# Patient Record
Sex: MALE | Race: BLACK OR AFRICAN AMERICAN | NOT HISPANIC OR LATINO | Employment: UNEMPLOYED | URBAN - METROPOLITAN AREA
[De-identification: names, ages, dates, MRNs, and addresses within clinical notes are randomized per-mention and may not be internally consistent; named-entity substitution may affect disease eponyms.]

---

## 2023-11-02 ENCOUNTER — OFFICE VISIT (OUTPATIENT)
Dept: OTOLARYNGOLOGY | Facility: CLINIC | Age: 18
End: 2023-11-02
Payer: COMMERCIAL

## 2023-11-02 DIAGNOSIS — Z93.0 TRACHEOSTOMY DEPENDENT (MULTI): ICD-10-CM

## 2023-11-02 DIAGNOSIS — Z93.0 TRACHEOSTOMY STATUS (MULTI): ICD-10-CM

## 2023-11-02 DIAGNOSIS — Z43.0 TRACHEOSTOMY CARE (MULTI): Primary | ICD-10-CM

## 2023-11-02 PROBLEM — K59.00 CONSTIPATION: Status: ACTIVE | Noted: 2023-11-02

## 2023-11-02 PROBLEM — J38.01 VOCAL CORD PARALYSIS, UNILATERAL COMPLETE: Status: ACTIVE | Noted: 2023-11-02

## 2023-11-02 PROBLEM — J39.8 TRACHEAL STENOSIS: Status: ACTIVE | Noted: 2023-11-02

## 2023-11-02 PROBLEM — M41.9 SCOLIOSIS: Status: ACTIVE | Noted: 2023-11-02

## 2023-11-02 PROBLEM — J96.10 CHRONIC RESPIRATORY FAILURE (MULTI): Status: ACTIVE | Noted: 2023-11-02

## 2023-11-02 PROBLEM — J45.30 MILD PERSISTENT ASTHMA WITHOUT COMPLICATION (HHS-HCC): Status: ACTIVE | Noted: 2023-11-02

## 2023-11-02 PROBLEM — E55.9 VITAMIN D DEFICIENCY: Status: ACTIVE | Noted: 2023-11-02

## 2023-11-02 PROBLEM — F29 PSYCHOSIS (MULTI): Chronic | Status: ACTIVE | Noted: 2022-09-07

## 2023-11-02 PROBLEM — J95.09: Status: ACTIVE | Noted: 2023-11-02

## 2023-11-02 PROBLEM — J38.6 SUBGLOTTIC STENOSIS: Status: ACTIVE | Noted: 2023-11-02

## 2023-11-02 PROCEDURE — 99214 OFFICE O/P EST MOD 30 MIN: CPT | Performed by: STUDENT IN AN ORGANIZED HEALTH CARE EDUCATION/TRAINING PROGRAM

## 2023-11-02 RX ORDER — FLUTICASONE PROPIONATE 110 UG/1
2 AEROSOL, METERED RESPIRATORY (INHALATION) 2 TIMES DAILY
COMMUNITY
Start: 2017-06-07

## 2023-11-02 RX ORDER — ALBUTEROL SULFATE 90 UG/1
2 AEROSOL, METERED RESPIRATORY (INHALATION) EVERY 4 HOURS PRN
COMMUNITY
Start: 2015-05-04

## 2023-11-02 RX ORDER — MUPIROCIN 20 MG/G
OINTMENT TOPICAL 2 TIMES DAILY
COMMUNITY
Start: 2017-07-25

## 2023-11-02 RX ORDER — QUETIAPINE FUMARATE 100 MG/1
1 TABLET, FILM COATED ORAL NIGHTLY
COMMUNITY
Start: 2023-02-06

## 2023-11-02 RX ORDER — ALBUTEROL SULFATE 0.83 MG/ML
2.5 SOLUTION RESPIRATORY (INHALATION) SEE ADMIN INSTRUCTIONS
COMMUNITY
Start: 2017-03-31

## 2023-11-02 RX ORDER — QUETIAPINE FUMARATE 50 MG/1
50 TABLET, FILM COATED ORAL NIGHTLY
COMMUNITY
Start: 2022-12-19

## 2023-11-02 RX ORDER — POLYETHYLENE GLYCOL 3350 17 G/17G
17 POWDER, FOR SOLUTION ORAL DAILY
COMMUNITY
Start: 2014-01-22

## 2023-11-02 NOTE — PROGRESS NOTES
Pediatric Otolaryngology - Head and Neck Surgery Outpatient New Patient Note    Chief Concern:  Establishment of trach care    Referred by Osbaldo    History Of Present Illness  Bandar Robins is a 18 y.o. male, who was a former established patient by Dr. Thorne, last seen in December 2021. 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 At last visit, Dr. Thorne discussed plans for an airway evaluation prior to planning trach decannulation, as well as upsizing tracheostomy at the time given the patient has progressed to an adult age. Unfortunately patient was lost to follow-up, so surgery has been postponed. He recently presented to the ED for abdominal pain and was referred to follow-up with ENT for long term trach management. Accompanied by sister.     Mother did most of trach care in home. Unfortunately, mother passed away several months ago, so they have not been able to adequately care for trach. The trach has not been changed in ~1 year. No breathing issues.     Past Medical History  He has a past medical history of 24 weeks gestation of pregnancy (03/30/2015), Congenital laryngomalacia, Other conditions influencing health status (03/30/2015), Other specified postprocedural states (03/30/2015), Outcome of delivery, unspecified (03/30/2015), and Paralysis of vocal cords and larynx, unspecified (04/07/2017).    Surgical History  He has a past surgical history that includes Other surgical history (01/22/2014).     Social History  He has no history on file for tobacco use, alcohol use, and drug use.    Family History  Family History   Problem Relation Name Age of Onset    Hypertension Mother      Sleep apnea Mother      Other (hysterectomy) Mother      Asthma Sister      Allergies Sister          multiple    Cystic fibrosis Other MUltiple Family members     Narcolepsy Other MUltiple Family members     Other (chronic lung disease)  Other MUltiple Family members         Allergies  Patient has no known allergies.    Review of Systems  A 12-point review of systems was performed and noted be negative except for that which was mentioned in the history of present illness     Last Recorded Vitals  There were no vitals taken for this visit.     PHYSICAL EXAMINATION:  Constitutional: Patient is alert and in No acute distress.   Head: ATNC  Eyes: Conjunctiva non-infected, EOMI, PERRL  Ears: External ears are normal with no deformities such as preauricular pits or tags. There is no mastoid swelling bilaterally. R EAC with severely impacted cerumen, L EAC patent with visible normal TM, no retraction.  Nose: External nasal anatomy normal, nasal passages patent and septum is midline. Nasal mucosa is pink and moist. There is no rhinorrhea, masses or polyps noted.  Oral Cavity: Lips, teeth and gums are in good condition. Oral mucosa is normal. Oropharynx is clear with no erythema, exudate or cobblestoning.   Neck: supple with no lymphadenopathy. Thyroid is nonpalpable and midline. 5.5 Tracheo peds length in place with HME. Stoma is healthy without granulation tissue  Skin: Skin of the head and neck are normal without rashes  Pulmonary: Respirations unlabored, no WOB noted, able to phonate around tracheostomy tube  Cardiovascular: Warm and well perfused, palpable pulses  Extremities: Appear normal, BUTTS x 4  Psych: age appropriate mood/affect  Neuro: Facial strength normal and symmetric.    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 Following up to establish care with ENT for possible decannulation planning.    PLAN:  OR for Flexible awake scope, followed by DLB, possible trach change to adult shiley trach with Dr. Thomas.    I have seen and examined the patient, performed all procedures, and reviewed all records.  I agree with the above history, physical exam, procedure notes, assessment and plan.    I  have personally reviewed and interpreted past medical records and diagnostic tests, obtained patient history, performed medical evaluation, counseled and educated patient/family members, ordered necessary medications/tests/procedures, communicated with other health care professionals.    This note was created using speech recognition transcription software/or scribe transcription services.  Despite proofreading, several typographical errors may be present that might affect the meaning of the content.  Please call with any questions.    Miriam Thomas MD  Pediatric Otolaryngology - Head and Neck Surgery   [facility]  11/2/2023

## 2023-11-06 PROBLEM — Z93.0 TRACHEOSTOMY STATUS (MULTI): Status: ACTIVE | Noted: 2023-11-02

## 2024-01-25 ENCOUNTER — HOSPITAL ENCOUNTER (OUTPATIENT)
Facility: HOSPITAL | Age: 19
Setting detail: OUTPATIENT SURGERY
Discharge: HOME | End: 2024-01-25
Attending: STUDENT IN AN ORGANIZED HEALTH CARE EDUCATION/TRAINING PROGRAM | Admitting: STUDENT IN AN ORGANIZED HEALTH CARE EDUCATION/TRAINING PROGRAM
Payer: COMMERCIAL

## 2024-01-25 ENCOUNTER — ANESTHESIA EVENT (OUTPATIENT)
Dept: OPERATING ROOM | Facility: HOSPITAL | Age: 19
End: 2024-01-25
Payer: COMMERCIAL

## 2024-01-25 ENCOUNTER — ANESTHESIA (OUTPATIENT)
Dept: OPERATING ROOM | Facility: HOSPITAL | Age: 19
End: 2024-01-25
Payer: COMMERCIAL

## 2024-01-25 VITALS
BODY MASS INDEX: 18.52 KG/M2 | HEIGHT: 63 IN | SYSTOLIC BLOOD PRESSURE: 92 MMHG | RESPIRATION RATE: 18 BRPM | TEMPERATURE: 97.3 F | WEIGHT: 104.5 LBS | OXYGEN SATURATION: 100 % | HEART RATE: 73 BPM | DIASTOLIC BLOOD PRESSURE: 66 MMHG

## 2024-01-25 DIAGNOSIS — Z93.0 TRACHEOSTOMY STATUS (MULTI): Primary | ICD-10-CM

## 2024-01-25 PROBLEM — K44.9 HIATAL HERNIA: Status: ACTIVE | Noted: 2024-01-25

## 2024-01-25 PROCEDURE — 7100000001 HC RECOVERY ROOM TIME - INITIAL BASE CHARGE: Performed by: STUDENT IN AN ORGANIZED HEALTH CARE EDUCATION/TRAINING PROGRAM

## 2024-01-25 PROCEDURE — 3600000008 HC OR TIME - EACH INCREMENTAL 1 MINUTE - PROCEDURE LEVEL THREE: Performed by: STUDENT IN AN ORGANIZED HEALTH CARE EDUCATION/TRAINING PROGRAM

## 2024-01-25 PROCEDURE — 3700000001 HC GENERAL ANESTHESIA TIME - INITIAL BASE CHARGE: Performed by: STUDENT IN AN ORGANIZED HEALTH CARE EDUCATION/TRAINING PROGRAM

## 2024-01-25 PROCEDURE — 3700000002 HC GENERAL ANESTHESIA TIME - EACH INCREMENTAL 1 MINUTE: Performed by: STUDENT IN AN ORGANIZED HEALTH CARE EDUCATION/TRAINING PROGRAM

## 2024-01-25 PROCEDURE — 3600000003 HC OR TIME - INITIAL BASE CHARGE - PROCEDURE LEVEL THREE: Performed by: STUDENT IN AN ORGANIZED HEALTH CARE EDUCATION/TRAINING PROGRAM

## 2024-01-25 PROCEDURE — 2500000004 HC RX 250 GENERAL PHARMACY W/ HCPCS (ALT 636 FOR OP/ED): Mod: SE

## 2024-01-25 PROCEDURE — 31525 DX LARYNGOSCOPY EXCL NB: CPT | Performed by: STUDENT IN AN ORGANIZED HEALTH CARE EDUCATION/TRAINING PROGRAM

## 2024-01-25 PROCEDURE — 7100000010 HC PHASE TWO TIME - EACH INCREMENTAL 1 MINUTE: Performed by: STUDENT IN AN ORGANIZED HEALTH CARE EDUCATION/TRAINING PROGRAM

## 2024-01-25 PROCEDURE — A31622 PR BRONCHOSCOPY,DIAGNOSTIC: Performed by: ANESTHESIOLOGY

## 2024-01-25 PROCEDURE — 2500000001 HC RX 250 WO HCPCS SELF ADMINISTERED DRUGS (ALT 637 FOR MEDICARE OP): Mod: SE | Performed by: STUDENT IN AN ORGANIZED HEALTH CARE EDUCATION/TRAINING PROGRAM

## 2024-01-25 PROCEDURE — 7100000002 HC RECOVERY ROOM TIME - EACH INCREMENTAL 1 MINUTE: Performed by: STUDENT IN AN ORGANIZED HEALTH CARE EDUCATION/TRAINING PROGRAM

## 2024-01-25 PROCEDURE — 7100000009 HC PHASE TWO TIME - INITIAL BASE CHARGE: Performed by: STUDENT IN AN ORGANIZED HEALTH CARE EDUCATION/TRAINING PROGRAM

## 2024-01-25 RX ORDER — DEXAMETHASONE SODIUM PHOSPHATE 100 MG/10ML
INJECTION INTRAMUSCULAR; INTRAVENOUS AS NEEDED
Status: DISCONTINUED | OUTPATIENT
Start: 2024-01-25 | End: 2024-01-25

## 2024-01-25 RX ORDER — SODIUM CHLORIDE, SODIUM LACTATE, POTASSIUM CHLORIDE, CALCIUM CHLORIDE 600; 310; 30; 20 MG/100ML; MG/100ML; MG/100ML; MG/100ML
100 INJECTION, SOLUTION INTRAVENOUS CONTINUOUS
Status: DISCONTINUED | OUTPATIENT
Start: 2024-01-25 | End: 2024-01-25 | Stop reason: HOSPADM

## 2024-01-25 RX ORDER — ONDANSETRON HYDROCHLORIDE 2 MG/ML
INJECTION, SOLUTION INTRAVENOUS AS NEEDED
Status: DISCONTINUED | OUTPATIENT
Start: 2024-01-25 | End: 2024-01-25

## 2024-01-25 RX ORDER — LIDOCAINE HYDROCHLORIDE 40 MG/ML
SOLUTION TOPICAL AS NEEDED
Status: DISCONTINUED | OUTPATIENT
Start: 2024-01-25 | End: 2024-01-25 | Stop reason: HOSPADM

## 2024-01-25 RX ORDER — FENTANYL CITRATE 50 UG/ML
INJECTION, SOLUTION INTRAMUSCULAR; INTRAVENOUS AS NEEDED
Status: DISCONTINUED | OUTPATIENT
Start: 2024-01-25 | End: 2024-01-25

## 2024-01-25 RX ORDER — PROPOFOL 10 MG/ML
INJECTION, EMULSION INTRAVENOUS AS NEEDED
Status: DISCONTINUED | OUTPATIENT
Start: 2024-01-25 | End: 2024-01-25

## 2024-01-25 RX ORDER — MIDAZOLAM HYDROCHLORIDE 1 MG/ML
INJECTION INTRAMUSCULAR; INTRAVENOUS AS NEEDED
Status: DISCONTINUED | OUTPATIENT
Start: 2024-01-25 | End: 2024-01-25

## 2024-01-25 RX ADMIN — PROPOFOL 30 MG: 10 INJECTION, EMULSION INTRAVENOUS at 13:47

## 2024-01-25 RX ADMIN — PROPOFOL 20 MG: 10 INJECTION, EMULSION INTRAVENOUS at 13:48

## 2024-01-25 RX ADMIN — FENTANYL CITRATE 25 MCG: 50 INJECTION, SOLUTION INTRAMUSCULAR; INTRAVENOUS at 13:41

## 2024-01-25 RX ADMIN — DEXAMETHASONE SODIUM PHOSPHATE 4 MG: 10 INJECTION INTRAMUSCULAR; INTRAVENOUS at 13:49

## 2024-01-25 RX ADMIN — PROPOFOL 10 MG: 10 INJECTION, EMULSION INTRAVENOUS at 13:45

## 2024-01-25 RX ADMIN — ONDANSETRON 4 MG: 2 INJECTION INTRAMUSCULAR; INTRAVENOUS at 13:49

## 2024-01-25 RX ADMIN — MIDAZOLAM HYDROCHLORIDE 2 MG: 1 INJECTION, SOLUTION INTRAMUSCULAR; INTRAVENOUS at 13:41

## 2024-01-25 SDOH — HEALTH STABILITY: MENTAL HEALTH: CURRENT SMOKER: 0

## 2024-01-25 ASSESSMENT — PAIN SCALES - GENERAL
PAINLEVEL_OUTOF10: 0 - NO PAIN
PAINLEVEL_OUTOF10: 0 - NO PAIN
PAIN_LEVEL: 0
PAINLEVEL_OUTOF10: 0 - NO PAIN

## 2024-01-25 ASSESSMENT — COLUMBIA-SUICIDE SEVERITY RATING SCALE - C-SSRS
2. HAVE YOU ACTUALLY HAD ANY THOUGHTS OF KILLING YOURSELF?: NO
1. IN THE PAST MONTH, HAVE YOU WISHED YOU WERE DEAD OR WISHED YOU COULD GO TO SLEEP AND NOT WAKE UP?: NO
6. HAVE YOU EVER DONE ANYTHING, STARTED TO DO ANYTHING, OR PREPARED TO DO ANYTHING TO END YOUR LIFE?: NO

## 2024-01-25 ASSESSMENT — PAIN - FUNCTIONAL ASSESSMENT
PAIN_FUNCTIONAL_ASSESSMENT: FLACC (FACE, LEGS, ACTIVITY, CRY, CONSOLABILITY)
PAIN_FUNCTIONAL_ASSESSMENT: 0-10

## 2024-01-25 NOTE — ANESTHESIA POSTPROCEDURE EVALUATION
Patient: Bandar Robins    Procedure Summary       Date: 01/25/24 Room / Location: [facility] OR 01 / [facility]    Anesthesia Start: 1333 Anesthesia Stop: 1400    Procedures:       Direct Laryngoscopy      Bronchoscopy Diagnosis:       Tracheostomy status (CMS/[facility])      (Tracheostomy status (CMS/[facility]) [Z93.0])    Surgeons: Miriam Thomas MD Responsible Provider: Nishi Gunter MD    Anesthesia Type: general ASA Status: 3            Anesthesia Type: No value filed.    Vitals Value Taken Time   /72 01/25/24 1404   Temp 36.1 01/25/24 1404   Pulse 73 01/25/24 1404   Resp 20 01/25/24 1404   SpO2 100 01/25/24 1404       Anesthesia Post Evaluation    Patient location during evaluation: PACU  Patient participation: complete - patient participated  Level of consciousness: awake  Pain score: 0  Pain management: adequate  Airway patency: patent  Cardiovascular status: acceptable and blood pressure returned to baseline  Respiratory status: acceptable and room air (through trach)  Hydration status: acceptable  Postoperative Nausea and Vomiting: none        No notable events documented.

## 2024-01-25 NOTE — ANESTHESIA PREPROCEDURE EVALUATION
Patient: Bandar Robins    Procedure Information       Anesthesia Start Date/Time: 01/25/24 1333    Procedures:       Direct Laryngoscopy      Bronchoscopy - possible dilation possible steroid injection    Location: [facility] OR 01 / [facility]    Surgeons: Miriam Thomas MD            Relevant Problems   Anesthesia (within normal limits)      Cardiovascular (within normal limits)      Endocrine (within normal limits)      GI   (+) Hiatal hernia      /Renal (within normal limits)      Neuro/Psych  CP      Pulmonary  Subglottic stenosis, trach. 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    (+) Mild persistent asthma without complication      GI/Hepatic (within normal limits)      Hematology (within normal limits)      Musculoskeletal   (+) Scoliosis      Eyes, Ears, Nose, and Throat  ROP      Infectious Disease (within normal limits)      Other  24 week prematurity       Clinical information reviewed:   Tobacco  Allergies  Meds   Med Hx  Surg Hx   Fam Hx  Soc Hx        NPO Detail:  NPO/Void Status  Date of Last Liquid: 01/24/24  Time of Last Liquid: 2100  Date of Last Solid: 01/24/24  Time of Last Solid: 2100  Last Intake Type: Clear fluids; Food         Physical Exam    Airway  Mallampati: unable to assess  Neck ROM: full     Cardiovascular   Rhythm: regular  Rate: normal     Dental - normal exam     Pulmonary   Breath sounds clear to auscultation     Abdominal - normal exam       Other findings: Uncuffed trach          Anesthesia Plan    History of general anesthesia?: yes  History of complications of general anesthesia?: no    ASA 3     general     The patient is not a current smoker.    Anesthetic plan and risks discussed with patient.    Plan discussed with resident.

## 2024-01-25 NOTE — OP NOTE
OPERATIVE NOTE     Date:  2024 OR Location: [facility] OR    Name: Bandar Robins : 2005, Age: 18 y.o., MRN: [id number], Sex: male      Surgeons   Miriam Thomas MD    Resident/Fellow/Other Assistant:  None were associated with this case.    Anesthesia: General  ASA: III  Anesthesia Staff: Anesthesiologist: Nishi Gunter MD  Anesthesia Resident: Dave Huizar DO  Staff: Circulator: Sharona Serrano RN; Xi Thomason RN  Scrub Person: Yu Brito      Preoperative Diagnosis:  Tracheostomy dependence    Postoperative Diagnosis:  Tracheostomy dependence    Procedure:  Direct laryngoscopy  Bronchoscopy    Findings:  -Supraglottis: Questionable asymmetry of arytenoid height, with right complex seated slightly higher  -Glottis: Mobility unable to be fully assessed given degree of sedation, cords fixed in a paramedian position  -Subglottis: Inversion of tracheal cartilage just above stoma with 50% obstruction of airway lumen  -Trachea: Unremarkable    Estimated Blood Loss:  None    Implantables/Drains:  N/A    Complications:  None    Description of Procedure:  This patient is a 18 y.o.-year-old male who presents with tracheostomy dependence. Given this, decision was made to proceed to the operating room for the above listed procedures. Consent was obtained from the patient after discussing the risks, benefits, and alternatives of the procedure.    The patient was brought to the operating room and transferred to the table. A preoperative huddle was performed, confirming the correct patient, procedure, laterality, and allergies. The patient was induced under anesthesia and turned 90 degrees to the ENT team.     Using a laryngoscope, the larynx was visualized and topical lidocaine was sprayed onto the vocal cords. The zero-degree endoscope was inserted and advanced to the level of the larynx, through the cords, and to the distal trachea. The findings were noted above. Photodocumentation was  obtained. The endoscope and laryngoscope were withdrawn and mask ventilation resumed. At this point, the tracheostomy was removed and the stoma was noted to be clear of any granulation tissue. This completed our procedure.      The patient was then handed back over to the care of the anesthesia team, awakened, and taken to recovery in stable condition.     Dr. Miriam Thomas was present for the critical portions of the procedure.     Miriam Thomas MD  Pediatric Otolaryngology - Head and Neck Surgery   [facility]

## 2024-01-26 NOTE — H&P
History Of Present Illness  Bandar Robins is a 18 y.o. male presenting with tracheostomy dependence.    Previous history:  Bandar Robins is a 18 y.o. male, who was a former established patient by Dr. Thorne, last seen in December 2021. 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 At last visit, Dr. Thorne discussed plans for an airway evaluation prior to planning trach decannulation, as well as upsizing tracheostomy at the time given the patient has progressed to an adult age. Unfortunately patient was lost to follow-up, so surgery has been postponed. He recently presented to the ED for abdominal pain and was referred to follow-up with ENT for long term trach management. Accompanied by sister.      Mother did most of trach care in home. Unfortunately, mother passed away several months ago, so they have not been able to adequately care for trach. The trach has not been changed in ~1 year. No breathing issues.      Past Medical History  He has a past medical history of 24 weeks gestation of pregnancy (03/30/2015), Congenital laryngomalacia, Other conditions influencing health status (03/30/2015), Other specified postprocedural states (03/30/2015), Outcome of delivery, unspecified (03/30/2015), and Paralysis of vocal cords and larynx, unspecified (04/07/2017).    Surgical History  He has a past surgical history that includes Other surgical history (01/22/2014) and Gastrostomy tube placement.     Social History  He reports that he has never smoked. He has never used smokeless tobacco. He reports that he does not drink alcohol and does not use drugs.    Family History  Family History   Problem Relation Name Age of Onset    Hypertension Mother      Sleep apnea Mother      Other (hysterectomy) Mother      Asthma Sister      Allergies Sister          multiple    Cystic fibrosis Other MUltiple Family members     Narcolepsy Other MUltiple  "Family members     Other (chronic lung disease) Other MUltiple Family members         Allergies  Patient has no known allergies.    Review of Systems    Review of Systems   All other systems reviewed and are negative.     The following portions of the patient's history were reviewed and updated as appropriate: allergies, current medications, past family history, past medical history, past social history, past surgical history and problem list.    Physical Exam  Constitutional: Patient is alert and in No acute distress.   Head: ATNC  Eyes: Conjunctiva non-infected, EOMI, PERRL  Ears: External ears are normal with no deformities such as preauricular pits or tags. There is no mastoid swelling bilaterally. R EAC with severely impacted cerumen, L EAC patent with visible normal TM, no retraction.  Nose: External nasal anatomy normal, nasal passages patent and septum is midline. Nasal mucosa is pink and moist. There is no rhinorrhea, masses or polyps noted.  Oral Cavity: Lips, teeth and gums are in good condition. Oral mucosa is normal. Oropharynx is clear with no erythema, exudate or cobblestoning.   Neck: supple with no lymphadenopathy. Thyroid is nonpalpable and midline. 5.5 Tracheo peds length in place with HME. Stoma is healthy without granulation tissue  Skin: Skin of the head and neck are normal without rashes  Pulmonary: Respirations unlabored, no WOB noted, able to phonate around tracheostomy tube  Cardiovascular: Warm and well perfused, palpable pulses  Extremities: Appear normal, BUTTS x 4  Psych: age appropriate mood/affect  Neuro: Facial strength normal and symmetric.       Last Recorded Vitals  Blood pressure 92/66, pulse 73, temperature 36.3 °C (97.3 °F), temperature source Temporal, resp. rate 18, height 1.595 m (5' 2.8\"), weight 47.4 kg (104 lb 8 oz), SpO2 100 %.    Tracheostomy dependence     Assessment/Plan   Principal Problem:    Tracheostomy status (CMS/[facility])  Active Problems:    Hiatal " hernia      Surveillence airway examination. OR to DLB.       Miriam Thomas MD

## 2024-04-30 ENCOUNTER — APPOINTMENT (OUTPATIENT)
Dept: OPHTHALMOLOGY | Facility: CLINIC | Age: 19
End: 2024-04-30
Payer: COMMERCIAL

## 2024-05-09 ENCOUNTER — APPOINTMENT (OUTPATIENT)
Dept: OPHTHALMOLOGY | Facility: CLINIC | Age: 19
End: 2024-05-09
Payer: COMMERCIAL

## 2024-06-11 ENCOUNTER — APPOINTMENT (OUTPATIENT)
Dept: OPHTHALMOLOGY | Facility: CLINIC | Age: 19
End: 2024-06-11
Payer: COMMERCIAL

## 2024-08-22 ENCOUNTER — APPOINTMENT (OUTPATIENT)
Dept: OPHTHALMOLOGY | Facility: CLINIC | Age: 19
End: 2024-08-22
Payer: COMMERCIAL

## 2024-12-06 ENCOUNTER — APPOINTMENT (OUTPATIENT)
Dept: OPHTHALMOLOGY | Facility: CLINIC | Age: 19
End: 2024-12-06
Payer: COMMERCIAL

## 2024-12-31 ENCOUNTER — APPOINTMENT (OUTPATIENT)
Dept: OPHTHALMOLOGY | Facility: CLINIC | Age: 19
End: 2024-12-31
Payer: COMMERCIAL

## 2025-05-12 ENCOUNTER — APPOINTMENT (OUTPATIENT)
Dept: OTOLARYNGOLOGY | Facility: HOSPITAL | Age: 20
End: 2025-05-12
Payer: COMMERCIAL

## 2025-05-12 NOTE — PROGRESS NOTES
ASSESSMENT AND PLAN:   Bandar Robins is a 19 y.o. male presenting for an initial visit with findings of ***           Reason For Consult  No chief complaint on file.    Referred by Dr. Miriam Thomas; from his note 1/25/25:  tracheostomy dependence.     Previous history:  Bandar Robins is a 18 y.o. male, who was a former established patient by Dr. Thorne, last seen in December 2021. 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 At last visit, Dr. Thorne discussed plans for an airway evaluation prior to planning trach decannulation, as well as upsizing tracheostomy at the time given the patient has progressed to an adult age. Unfortunately patient was lost to follow-up, so surgery has been postponed. He recently presented to the ED for abdominal pain and was referred to follow-up with ENT for long term trach management. Accompanied by sister.      Mother did most of trach care in home. Unfortunately, mother passed away several months ago, so they have not been able to adequately care for trach. The trach has not been changed in ~1 year. No breathing issues.      HISTORY OF PRESENT ILLNESS:  Bandar Robins is a 19 y.o. male presenting for an initial visit with me for ***.      The patient reports ***.         Past Medical History  He has a past medical history of 24 weeks gestation of pregnancy ([facility]) (03/30/2015), Congenital laryngomalacia, Other conditions influencing health status (03/30/2015), Other specified postprocedural states (03/30/2015), Outcome of delivery, unspecified ([facility]) (03/30/2015), and Paralysis of vocal cords and larynx, unspecified (04/07/2017). Surgical History  He has a past surgical history that includes Other surgical history (01/22/2014) and Gastrostomy tube placement.   Social History  He reports that he has never smoked. He has never used smokeless tobacco. He reports that he does not drink alcohol  and does not use drugs. Allergies  Patient has no known allergies.     Family History  Family History[1]     Review of Systems  All 10 systems were reviewed and negative except for above.      Physical Exam    ENT Physical Exam   ENT Physical Exam  Constitutional  Appearance: patient appears well-developed and well-nourished,  Head and Face  Appearance: head appears normal and face appears normal;  Ear  Auricles: right auricle normal; left auricle normal;  Nose  External Nose: nares patent bilaterally;  Oral Cavity/Oropharynx  Lips: normal;  Neck  Neck: neck normal; neck palpation normal;  Respiratory  Inspection: no retractions visible;  Cardiovascular  Inspection: no peripheral edema present;  Neurovestibular  Mental Status: alert and oriented;  Psychiatric: mood normal;  Cranial Nerves: cranial nerves intact;    Last Recorded Vitals  There were no vitals taken for this visit.    Relevant Results       ----------------------------------------------------------------------  Procedures   ***    Time Spent  Prep time on day of patient encounter: 5-10 minutes  Time spent directly with patient, family or caregiver: 25 minutes  Additional Time Spent on Patient Care Activities/discuss care plan with SLP: 5 minutes  Documentation Time: 10 minutes  Other Time Spent: 0 minutes  Total: 50 minutes     Scribe Attestation  By signing my name below, I, Leeann Richards , [place]   attest that this documentation has been prepared under the direction and in the presence of Prince Gann MD.       [1]   Family History  Problem Relation Name Age of Onset    Hypertension Mother      Sleep apnea Mother      Other (hysterectomy) Mother      Asthma Sister      Allergies Sister          multiple    Cystic fibrosis Other MUltiple Family members     Narcolepsy Other MUltiple Family members     Other (chronic lung disease) Other MUltiple Family members

## 2025-06-09 ENCOUNTER — APPOINTMENT (OUTPATIENT)
Dept: OTOLARYNGOLOGY | Facility: HOSPITAL | Age: 20
End: 2025-06-09
Payer: COMMERCIAL

## 2025-06-09 VITALS — TEMPERATURE: 99 F | HEIGHT: 62 IN | BODY MASS INDEX: 19.14 KG/M2 | WEIGHT: 104 LBS

## 2025-06-09 DIAGNOSIS — Z43.0 TRACHEOSTOMY CARE: Primary | ICD-10-CM

## 2025-06-09 DIAGNOSIS — Z93.0 TRACHEOSTOMY DEPENDENT (MULTI): ICD-10-CM

## 2025-06-09 PROCEDURE — 99214 OFFICE O/P EST MOD 30 MIN: CPT | Mod: 25 | Performed by: OTOLARYNGOLOGY

## 2025-06-09 PROCEDURE — 31575 DIAGNOSTIC LARYNGOSCOPY: CPT | Performed by: OTOLARYNGOLOGY

## 2025-06-09 PROCEDURE — 31615 TRCHEOBRNCHSC EST TRACHS INC: CPT | Performed by: OTOLARYNGOLOGY

## 2025-06-09 ASSESSMENT — PATIENT HEALTH QUESTIONNAIRE - PHQ9
2. FEELING DOWN, DEPRESSED OR HOPELESS: NOT AT ALL
1. LITTLE INTEREST OR PLEASURE IN DOING THINGS: NOT AT ALL
SUM OF ALL RESPONSES TO PHQ9 QUESTIONS 1 & 2: 0

## 2025-06-09 ASSESSMENT — PAIN SCALES - GENERAL: PAINLEVEL_OUTOF10: 0-NO PAIN

## 2025-06-09 NOTE — PROGRESS NOTES
ASSESSMENT AND PLAN:   Bandar Robins is a 20 y.o. male presenting for an initial visit with findings of airway stenosis with tracheostomy. He has stenosis that is causing him to have difficulty with capping. I anticipate a partial left arytenoidectomy and removal of scar from the interarytenoid space will improve QOL and breathing quality leading to decannulation. The risks, indications, and complications were discussed with the patient.     He will follow up for this procedure. He will require 1-2 visits after this with possible decannulation before the year.          Assessment & Plan  The patient presents with airway stenosis with tracheostomy and a history of left vocal cord immobility who was found to have the following findings on bronchoscopy and laryngoscopy: airway narrowing causing capping difficulty, absent left arytenoid tissue with scarring, lateral supraglottic tension, fully closed glottis, and right movement potential on bronchoscopy. Physical exam showed no subglottic edema, thick mucus, granuloma, or diffuse laryngitis. Voice grade 2, roughness 1, breathiness 1, asthenia 1, strain 1. Reduced intelligibility, balanced resonance, reduced vocal loudness, and decreased breath support.    We discussed options for management to include: left partial arytenoidectomy and interarytenoid scar removal to improve breathing and enable decannulation.    Treatment plan:  - Left partial arytenoidectomy and interarytenoid scar removal.    Clinical decision making:  - Risks: injury to lips, teeth, gums, tongue, slight increased swallowing problems.  - Benefits: improved breathing, potential decannulation.  - Patient is a good candidate.    Follow-up:  - Follow-up for decannulation, aiming for end of year.    PROCEDURE  Procedure Performed  Bronchoscopy and laryngoscopy    Anesthesia  4% spray through stoma    Technique  Pre-Procedure:  - Risks: injury to lips, teeth, gums, tongue; laser use; steroid injection;  slight increased swallowing problems.  - Consent: Verbal consent obtained.    Intra-Procedure:  - Site Preparation: Removal of HME, 4% spray through stoma.    Post-Procedure:  - Tolerance: Tolerated well    Notes: Right vocal cord movement, left immobility, interarytenoid scar band, posterior web. Future procedure for scar band removal and partial arytenoidectomy recommended.        Reason For Consult  Chief Complaint   Patient presents with    Follow-up     Trach check,     Referred by Dr. Miriam Thomas; from his note 1/25/25:  tracheostomy dependence.     Previous history:  Bandar Robins is a 18 y.o. male, who was a former established patient by Dr. Thorne, last seen in December 2021. 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 At last visit, Dr. Thorne discussed plans for an airway evaluation prior to planning trach decannulation, as well as upsizing tracheostomy at the time given the patient has progressed to an adult age. Unfortunately patient was lost to follow-up, so surgery has been postponed. He recently presented to the ED for abdominal pain and was referred to follow-up with ENT for long term trach management. Accompanied by sister.      Mother did most of trach care in home. Unfortunately, mother passed away several months ago, so they have not been able to adequately care for trach. The trach has not been changed in ~1 year. No breathing issues.      HISTORY OF PRESENT ILLNESS:  Bandar Robins is a 20 y.o. male presenting for an initial visit with me for a trach change and evaluation.  He is breathing well and is able to perform his own tracheostomy care independently.      History of Present Illness  The patient is a 20-year-old male who has a tracheostomy due to BPD.    Tracheostomy Care  - He takes care of his trach on his own and last changed it about two months ago.  - He says his breathing feels fine, his voice  is strong, and he has no trouble swallowing.    Diet and Work Status  - He is able to eat a full diet and is not currently working.     Past Medical History  He has a past medical history of 24 weeks gestation of pregnancy (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 Surgical History  He has a past surgical history that includes Other surgical history (01/22/2014) and Gastrostomy tube placement.   Social History  He reports that he has never smoked. He has never used smokeless tobacco. He reports that he does not drink alcohol and does not use drugs. Allergies  Patient has no known allergies.     Family History  Family History[1]     Review of Systems  All 10 systems were reviewed and negative except for above.      Physical Exam    ENT Physical Exam   ENT Physical Exam  Constitutional  Appearance: patient appears well-developed and well-nourished,  Head and Face  Appearance: head appears normal and face appears normal;  Ear  Auricles: right auricle normal; left auricle normal;  Nose  External Nose: nares patent bilaterally;  Oral Cavity/Oropharynx  Lips: normal;  Neck  Neck: neck normal; neck palpation normal;  Respiratory  Inspection: no retractions visible;  Cardiovascular  Inspection: no peripheral edema present;  Neurovestibular  Mental Status: alert and oriented;  Psychiatric: mood normal;  Cranial Nerves: cranial nerves intact;    Physical Exam  Oral Cavity: Palate movement intact.  Specialty Assessment: Bronchoscopy and laryngoscopy show airway narrowing, posterior web, interarytenoid scar band, right vocal cord movement, left vocal cord immobility. No subglottic edema, thick mucus, granuloma, or diffuse laryngitis. Mild internal thickening, partial ventricular obliteration, no vocal  "fold edema. Left arytenoid tissue absent with scarring.    Results  - Imaging:    - Bronchoscopy: Airway narrowing, right vocal cord movement, left cord immobile, scarring, posterior scar band    - Laryngoscopy: Airway narrowing, right vocal cord movement, left cord immobile, scarring, posterior scar band      Last Recorded Vitals  Temperature 37.2 °C (99 °F), temperature source Temporal, height 1.575 m (5' 2\"), weight 47.2 kg (104 lb).    Relevant Results       ----------------------------------------------------------------------  Procedures   Flexible Laryngoscopy w/ Videostroboscopy    VOICE AND SPEECH CHARACTERISTICS:  Normal spoken speech, (+) moderate dysphonia, (+) mild roughness, (+) mild breathiness, (+) mild asthenia, (+) mild strain.    Intelligibility: reduced.   Resonance: balanced.   Vocal Loudness: reduced.   Breath Support: decreased.    PROCEDURE:    Indications: difficulty breathing  PROCEDURE NOTE: BRONCHOSCOPY VIA TRACHEOSTOMY   I recommended bronchoscopy via tracheostomy based on PE findings, and/or concern for pathology based upon history of airway complaints. Risks, benefits, and alternatives were explained. The patient wishes to proceed and gives verbal consent.  Patient is seated in the exam chair. After adequate topical anesthesia, I advance the flexible endoscope. The examination included evaluation of the rogers, vallecula, base of tongue, pyriforms, post-cricoid area, larynx and immediate subglottis.  Findings : assessment of the nasopharynx, base of tongue/vallecula, pyriform sinuses, post-cricoid area and pharyngeal walls was without lesion or mass, pharyngeal wall contraction is normal and symmetric, and no pooling of secretions  ventricular obliteration: 2 (present) and IA thickenin (mild)  Gross Arytenoid Movement: immobility left with absent left arytenoid tissue and scarring  Arytenoid Height: normal.   Supraglottic Tension: lateral.  Closure: closed.  Additional Findings: " movement potential on the right on bronchoscopy.      Time Spent  Prep time on day of patient encounter: 5-10 minutes  Time spent directly with patient, family or caregiver: 25 minutes  Additional Time Spent on Patient Care Activities/discuss care plan with SLP: 5 minutes  Documentation Time: 10 minutes  Other Time Spent: 0 minutes  Total: 50 minutes     Scribe Attestation  By signing my name below, I, Tracy Frausto , Scribe attest that this documentation has been prepared under the direction and in the presence of Prince Gann MD.      This medical note was created with the assistance of artificial intelligence (AI) for documentation purposes. The content has been reviewed and confirmed by the healthcare provider for accuracy and completeness. Patient consented to the use of audio recording and use of AI during their visit.          [1]   Family History  Problem Relation Name Age of Onset    Hypertension Mother      Sleep apnea Mother      Other (hysterectomy) Mother      Asthma Sister      Allergies Sister          multiple    Cystic fibrosis Other MUltiple Family members     Narcolepsy Other MUltiple Family members     Other (chronic lung disease) Other MUltiple Family members

## 2025-06-20 DIAGNOSIS — Z93.0 TRACHEOSTOMY DEPENDENT (MULTI): ICD-10-CM

## 2025-06-20 DIAGNOSIS — J39.8 TRACHEAL STENOSIS: ICD-10-CM

## 2025-07-23 ENCOUNTER — ANESTHESIA (OUTPATIENT)
Dept: OPERATING ROOM | Facility: HOSPITAL | Age: 20
End: 2025-07-23
Payer: COMMERCIAL

## 2025-07-23 ENCOUNTER — HOSPITAL ENCOUNTER (OUTPATIENT)
Facility: HOSPITAL | Age: 20
Setting detail: OUTPATIENT SURGERY
Discharge: HOME | End: 2025-07-23
Attending: OTOLARYNGOLOGY | Admitting: OTOLARYNGOLOGY
Payer: COMMERCIAL

## 2025-07-23 ENCOUNTER — ANESTHESIA EVENT (OUTPATIENT)
Dept: OPERATING ROOM | Facility: HOSPITAL | Age: 20
End: 2025-07-23
Payer: COMMERCIAL

## 2025-07-23 VITALS
OXYGEN SATURATION: 100 % | HEIGHT: 62 IN | WEIGHT: 102.95 LBS | RESPIRATION RATE: 17 BRPM | BODY MASS INDEX: 18.95 KG/M2 | HEART RATE: 73 BPM | DIASTOLIC BLOOD PRESSURE: 70 MMHG | SYSTOLIC BLOOD PRESSURE: 117 MMHG | TEMPERATURE: 97.2 F

## 2025-07-23 DIAGNOSIS — Z93.0 TRACHEOSTOMY DEPENDENT (MULTI): ICD-10-CM

## 2025-07-23 DIAGNOSIS — J38.01 VOCAL CORD PARALYSIS, UNILATERAL COMPLETE: ICD-10-CM

## 2025-07-23 DIAGNOSIS — J39.8 TRACHEAL STENOSIS: Primary | ICD-10-CM

## 2025-07-23 PROBLEM — F32.A DEPRESSION: Status: ACTIVE | Noted: 2025-07-23

## 2025-07-23 PROCEDURE — 7100000009 HC PHASE TWO TIME - INITIAL BASE CHARGE: Performed by: OTOLARYNGOLOGY

## 2025-07-23 PROCEDURE — 7100000002 HC RECOVERY ROOM TIME - EACH INCREMENTAL 1 MINUTE: Performed by: OTOLARYNGOLOGY

## 2025-07-23 PROCEDURE — 31541 LARYNSCOP W/TUMR EXC + SCOPE: CPT | Performed by: OTOLARYNGOLOGY

## 2025-07-23 PROCEDURE — A31571 PR LARYNGOSCOPY,DIRECT,SCOPE,INJ CORDS: Performed by: ANESTHESIOLOGY

## 2025-07-23 PROCEDURE — 3600000007 HC OR TIME - EACH INCREMENTAL 1 MINUTE - PROCEDURE LEVEL TWO: Performed by: OTOLARYNGOLOGY

## 2025-07-23 PROCEDURE — 3600000004 HC OR TIME - INITIAL BASE CHARGE - PROCEDURE LEVEL FOUR: Performed by: OTOLARYNGOLOGY

## 2025-07-23 PROCEDURE — 2500000004 HC RX 250 GENERAL PHARMACY W/ HCPCS (ALT 636 FOR OP/ED): Mod: SE | Performed by: OTOLARYNGOLOGY

## 2025-07-23 PROCEDURE — 7100000001 HC RECOVERY ROOM TIME - INITIAL BASE CHARGE: Performed by: OTOLARYNGOLOGY

## 2025-07-23 PROCEDURE — 3600000009 HC OR TIME - EACH INCREMENTAL 1 MINUTE - PROCEDURE LEVEL FOUR: Performed by: OTOLARYNGOLOGY

## 2025-07-23 PROCEDURE — 2500000004 HC RX 250 GENERAL PHARMACY W/ HCPCS (ALT 636 FOR OP/ED): Mod: SE | Performed by: ANESTHESIOLOGY

## 2025-07-23 PROCEDURE — 3600000002 HC OR TIME - INITIAL BASE CHARGE - PROCEDURE LEVEL TWO: Performed by: OTOLARYNGOLOGY

## 2025-07-23 PROCEDURE — 3700000002 HC GENERAL ANESTHESIA TIME - EACH INCREMENTAL 1 MINUTE: Performed by: OTOLARYNGOLOGY

## 2025-07-23 PROCEDURE — 3700000001 HC GENERAL ANESTHESIA TIME - INITIAL BASE CHARGE: Performed by: OTOLARYNGOLOGY

## 2025-07-23 PROCEDURE — 2500000004 HC RX 250 GENERAL PHARMACY W/ HCPCS (ALT 636 FOR OP/ED): Mod: SE | Performed by: ANESTHESIOLOGIST ASSISTANT

## 2025-07-23 PROCEDURE — 2720000007 HC OR 272 NO HCPCS: Performed by: OTOLARYNGOLOGY

## 2025-07-23 PROCEDURE — 2500000005 HC RX 250 GENERAL PHARMACY W/O HCPCS: Mod: SE | Performed by: OTOLARYNGOLOGY

## 2025-07-23 PROCEDURE — 7100000010 HC PHASE TWO TIME - EACH INCREMENTAL 1 MINUTE: Performed by: OTOLARYNGOLOGY

## 2025-07-23 PROCEDURE — A31571 PR LARYNGOSCOPY,DIRECT,SCOPE,INJ CORDS: Performed by: ANESTHESIOLOGIST ASSISTANT

## 2025-07-23 PROCEDURE — 31571 LARYNGOSCOP W/VC INJ + SCOPE: CPT | Performed by: OTOLARYNGOLOGY

## 2025-07-23 PROCEDURE — 31561 LARYNSCOP REMVE CART + SCOP: CPT | Performed by: OTOLARYNGOLOGY

## 2025-07-23 RX ORDER — FENTANYL CITRATE 50 UG/ML
INJECTION, SOLUTION INTRAMUSCULAR; INTRAVENOUS CONTINUOUS PRN
Status: DISCONTINUED | OUTPATIENT
Start: 2025-07-23 | End: 2025-07-23

## 2025-07-23 RX ORDER — OXYCODONE HYDROCHLORIDE 5 MG/1
5 TABLET ORAL EVERY 4 HOURS PRN
Status: DISCONTINUED | OUTPATIENT
Start: 2025-07-23 | End: 2025-07-24 | Stop reason: HOSPADM

## 2025-07-23 RX ORDER — LIDOCAINE HYDROCHLORIDE 10 MG/ML
0.1 INJECTION, SOLUTION INFILTRATION; PERINEURAL ONCE
Status: DISCONTINUED | OUTPATIENT
Start: 2025-07-23 | End: 2025-07-24 | Stop reason: HOSPADM

## 2025-07-23 RX ORDER — PROCHLORPERAZINE EDISYLATE 5 MG/ML
5 INJECTION INTRAMUSCULAR; INTRAVENOUS ONCE AS NEEDED
Status: COMPLETED | OUTPATIENT
Start: 2025-07-23 | End: 2025-07-23

## 2025-07-23 RX ORDER — DEXAMETHASONE SODIUM PHOSPHATE 10 MG/ML
INJECTION INTRAMUSCULAR; INTRAVENOUS AS NEEDED
Status: DISCONTINUED | OUTPATIENT
Start: 2025-07-23 | End: 2025-07-23 | Stop reason: HOSPADM

## 2025-07-23 RX ORDER — ACETAMINOPHEN 325 MG/1
650 TABLET ORAL EVERY 4 HOURS PRN
Status: DISCONTINUED | OUTPATIENT
Start: 2025-07-23 | End: 2025-07-24 | Stop reason: HOSPADM

## 2025-07-23 RX ORDER — MIDAZOLAM HYDROCHLORIDE 2 MG/2ML
INJECTION, SOLUTION INTRAMUSCULAR; INTRAVENOUS AS NEEDED
Status: DISCONTINUED | OUTPATIENT
Start: 2025-07-23 | End: 2025-07-23

## 2025-07-23 RX ORDER — SODIUM CHLORIDE 0.9 G/100ML
INJECTION, SOLUTION IRRIGATION AS NEEDED
Status: DISCONTINUED | OUTPATIENT
Start: 2025-07-23 | End: 2025-07-23 | Stop reason: HOSPADM

## 2025-07-23 RX ORDER — ESMOLOL HYDROCHLORIDE 10 MG/ML
INJECTION INTRAVENOUS AS NEEDED
Status: DISCONTINUED | OUTPATIENT
Start: 2025-07-23 | End: 2025-07-23

## 2025-07-23 RX ORDER — IBUPROFEN 400 MG/1
400 TABLET, FILM COATED ORAL EVERY 6 HOURS PRN
Qty: 40 TABLET | Refills: 0 | Status: SHIPPED | OUTPATIENT
Start: 2025-07-23 | End: 2025-08-02

## 2025-07-23 RX ORDER — CIPROFLOXACIN 500 MG/5ML
500 KIT ORAL 2 TIMES DAILY
Qty: 70 ML | Refills: 0 | Status: SHIPPED | OUTPATIENT
Start: 2025-07-23 | End: 2025-07-30

## 2025-07-23 RX ORDER — ONDANSETRON HYDROCHLORIDE 2 MG/ML
INJECTION, SOLUTION INTRAVENOUS AS NEEDED
Status: DISCONTINUED | OUTPATIENT
Start: 2025-07-23 | End: 2025-07-23

## 2025-07-23 RX ORDER — SODIUM CHLORIDE, SODIUM LACTATE, POTASSIUM CHLORIDE, CALCIUM CHLORIDE 600; 310; 30; 20 MG/100ML; MG/100ML; MG/100ML; MG/100ML
INJECTION, SOLUTION INTRAVENOUS CONTINUOUS PRN
Status: DISCONTINUED | OUTPATIENT
Start: 2025-07-23 | End: 2025-07-23

## 2025-07-23 RX ORDER — ONDANSETRON HYDROCHLORIDE 2 MG/ML
4 INJECTION, SOLUTION INTRAVENOUS ONCE AS NEEDED
Status: COMPLETED | OUTPATIENT
Start: 2025-07-23 | End: 2025-07-23

## 2025-07-23 RX ORDER — PROPOFOL 10 MG/ML
INJECTION, EMULSION INTRAVENOUS AS NEEDED
Status: DISCONTINUED | OUTPATIENT
Start: 2025-07-23 | End: 2025-07-23

## 2025-07-23 RX ORDER — ROCURONIUM BROMIDE 10 MG/ML
INJECTION, SOLUTION INTRAVENOUS AS NEEDED
Status: DISCONTINUED | OUTPATIENT
Start: 2025-07-23 | End: 2025-07-23

## 2025-07-23 RX ORDER — HYDROMORPHONE HYDROCHLORIDE 0.2 MG/ML
0.2 INJECTION INTRAMUSCULAR; INTRAVENOUS; SUBCUTANEOUS EVERY 5 MIN PRN
Status: DISCONTINUED | OUTPATIENT
Start: 2025-07-23 | End: 2025-07-24 | Stop reason: HOSPADM

## 2025-07-23 RX ORDER — ACETAMINOPHEN 500 MG
1000 TABLET ORAL EVERY 8 HOURS PRN
Qty: 90 TABLET | Refills: 0 | Status: SHIPPED | OUTPATIENT
Start: 2025-07-23 | End: 2025-08-07

## 2025-07-23 RX ORDER — LIDOCAINE HCL/PF 100 MG/5ML
SYRINGE (ML) INTRAVENOUS AS NEEDED
Status: DISCONTINUED | OUTPATIENT
Start: 2025-07-23 | End: 2025-07-23

## 2025-07-23 RX ORDER — SODIUM CHLORIDE, SODIUM LACTATE, POTASSIUM CHLORIDE, CALCIUM CHLORIDE 600; 310; 30; 20 MG/100ML; MG/100ML; MG/100ML; MG/100ML
50 INJECTION, SOLUTION INTRAVENOUS CONTINUOUS
Status: DISCONTINUED | OUTPATIENT
Start: 2025-07-23 | End: 2025-07-24 | Stop reason: HOSPADM

## 2025-07-23 RX ORDER — FLUORESCEIN SODIUM 1 MG/MG
STRIP OPHTHALMIC AS NEEDED
Status: DISCONTINUED | OUTPATIENT
Start: 2025-07-23 | End: 2025-07-23 | Stop reason: HOSPADM

## 2025-07-23 RX ORDER — EPINEPHRINE 1 MG/ML
INJECTION, SOLUTION, CONCENTRATE INTRAVENOUS AS NEEDED
Status: DISCONTINUED | OUTPATIENT
Start: 2025-07-23 | End: 2025-07-23 | Stop reason: HOSPADM

## 2025-07-23 RX ADMIN — REMIFENTANIL HYDROCHLORIDE 40 MCG: 1 INJECTION, POWDER, LYOPHILIZED, FOR SOLUTION INTRAVENOUS at 17:03

## 2025-07-23 RX ADMIN — ONDANSETRON 4 MG: 2 INJECTION INTRAMUSCULAR; INTRAVENOUS at 17:04

## 2025-07-23 RX ADMIN — SODIUM CHLORIDE, POTASSIUM CHLORIDE, SODIUM LACTATE AND CALCIUM CHLORIDE: 600; 310; 30; 20 INJECTION, SOLUTION INTRAVENOUS at 16:48

## 2025-07-23 RX ADMIN — ESMOLOL HYDROCHLORIDE 40 MG: 10 INJECTION, SOLUTION INTRAVENOUS at 17:01

## 2025-07-23 RX ADMIN — SUGAMMADEX 200 MG: 100 INJECTION, SOLUTION INTRAVENOUS at 18:03

## 2025-07-23 RX ADMIN — DEXAMETHASONE SODIUM PHOSPHATE 10 MG: 4 INJECTION INTRA-ARTICULAR; INTRALESIONAL; INTRAMUSCULAR; INTRAVENOUS; SOFT TISSUE at 17:01

## 2025-07-23 RX ADMIN — ONDANSETRON 4 MG: 2 INJECTION INTRAMUSCULAR; INTRAVENOUS at 18:36

## 2025-07-23 RX ADMIN — PROCHLORPERAZINE EDISYLATE 5 MG: 5 INJECTION INTRAMUSCULAR; INTRAVENOUS at 18:50

## 2025-07-23 RX ADMIN — PROPOFOL 110 MG: 10 INJECTION, EMULSION INTRAVENOUS at 16:54

## 2025-07-23 RX ADMIN — MIDAZOLAM HYDROCHLORIDE 2 MG: 2 INJECTION, SOLUTION INTRAMUSCULAR; INTRAVENOUS at 16:47

## 2025-07-23 RX ADMIN — LIDOCAINE HYDROCHLORIDE 70 MG: 20 INJECTION, SOLUTION INTRAVENOUS at 16:54

## 2025-07-23 RX ADMIN — REMIFENTANIL HYDROCHLORIDE 0.08 MCG/KG/MIN: 1 INJECTION, POWDER, LYOPHILIZED, FOR SOLUTION INTRAVENOUS at 16:59

## 2025-07-23 RX ADMIN — ROCURONIUM BROMIDE 50 MG: 10 INJECTION, SOLUTION INTRAVENOUS at 16:55

## 2025-07-23 ASSESSMENT — PAIN SCALES - GENERAL
PAINLEVEL_OUTOF10: 0 - NO PAIN
PAIN_LEVEL: 0
PAINLEVEL_OUTOF10: 0 - NO PAIN

## 2025-07-23 ASSESSMENT — COLUMBIA-SUICIDE SEVERITY RATING SCALE - C-SSRS
1. IN THE PAST MONTH, HAVE YOU WISHED YOU WERE DEAD OR WISHED YOU COULD GO TO SLEEP AND NOT WAKE UP?: NO
6. HAVE YOU EVER DONE ANYTHING, STARTED TO DO ANYTHING, OR PREPARED TO DO ANYTHING TO END YOUR LIFE?: NO
2. HAVE YOU ACTUALLY HAD ANY THOUGHTS OF KILLING YOURSELF?: NO

## 2025-07-23 ASSESSMENT — PAIN - FUNCTIONAL ASSESSMENT: PAIN_FUNCTIONAL_ASSESSMENT: 0-10

## 2025-07-23 ASSESSMENT — ENCOUNTER SYMPTOMS
PSYCHIATRIC NEGATIVE: 1
CARDIOVASCULAR NEGATIVE: 1
FEVER: 0

## 2025-07-23 NOTE — OP NOTE
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]     Procedures  Microlaryngoscopy, Bronchoscopy, CO2 laser  21583 - ME LARYNGOSCOPY W/WO TRACHEOSCOPY W/MICRO/TELESCOPE    Microlaryngoscopy, Bronchoscopy, CO2 laser  47845 - ME BRNCHSC INCL FLUOR GDNCE DX W/CELL WASHG SPX    Microlaryngoscopy, Bronchoscopy, CO2 laser  60101 - ME LARGSC EXC KIA&/STRPG CORDS/EPIGL MCRSCP/TLSCP    ME LARGSC W/NJX VOCAL CORD THER W/MICRO/TELESCOPE [49107]  Surgeons      * Prince Gann - Primary    Resident/Fellow/Other Assistant:  Surgeons and Role:     * Katharina Peña MD - Resident - Assisting    Staff:   Circulator: Shelia  Scrub Person: Sarah Valiente Circulator: Jalen    Anesthesia Staff: Anesthesiologist: Nikki Pires MD  C-AA: ALEX Bacon    Procedure Summary  Anesthesia: General  ASA: III  Estimated Blood Loss: 5mL  Intra-op Medications: Administrations occurring from 1240 to 1405 on 25:  * No intraprocedure medications in log *           Anesthesia Record               Intraprocedure I/O Totals          Intake    Remifentanil Drip 0.00 mL    The total shown is the total volume documented since Anesthesia Start was filed.    Total Intake 0 mL          Specimen: No specimens collected     Findings:   - completely ankylosed bilateral arytenoids   - significant portion of posterior glottic stenosis which was ablated with CO2 laser  - right partial superstructure arytenoidectomy  - balloon dilation with 18mm balloon    Indications: Bandar Robins is an 20 y.o. 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     The patient was seen in the preoperative area. The risks, benefits, complications, treatment options, non-operative alternatives,  expected recovery and outcomes were discussed with the patient. The possibilities of reaction to medication, pulmonary aspiration, injury to surrounding structures, bleeding, recurrent infection, the need for additional procedures, failure to diagnose a condition, and creating a complication requiring transfusion or operation were discussed with the patient. The patient concurred with the proposed plan, giving informed consent.  The site of surgery was properly noted/marked if necessary per policy. The patient has been actively warmed in preoperative area. Preoperative antibiotics are not indicated. Venous thrombosis prophylaxis have been ordered including bilateral sequential compression devices    Procedure Details:   The patient was brought back to the operating room and transferred to the operating room table.  The patient was intubated by anesthesia through his tracheal stoma. The patient was pre-oxygenated with 100% oxygen.  The bed was turned 90° to the laryngology team.    A dental guard was placed on the upper dentition.  The dedo laryngoscope was introduced transorally along the right side of the tongue.  No concerning masses or lesions were identified in the oral cavity, oropharynx or pharynx.     A telescope was used to inspect the airway.  Photodocumentation was performed. The arytenoids were noted to be bulky. Other findings as described in findings.     The dedo laryngoscope was advanced into  position and the patient was prepared for CO2 laser use in the airway. The posterior glottis was noted to be fixed due to a thick scar band. The CO2 laser was brought in.     A laser safety time out was performed.  Oxygen concentration was dropped to below 35% with the jet ventilator, eye protection and wet towels were placed upon the face. Laser tube in use. No nitrous was in use. The line of sight laser mounted to the microscope with a micromanipulator was used to incise the portions of posterior glottic  stenosis. Left>right in the IA space.  Performed right supraglottoplasty/partial arytenoidectomy.     An 18 mm radial expansion balloon with guidewire was inserted from above and inflated to 4atm with visualization using the telescope and microscope.   This resulted in significant improvement in lumen diameter. Additional laser was used to ablate excess tissue in the posterior glottis.  The laser was then used to ablate/remove the tissue of the superior right arytenoid complex as described in findings. At the conclusion the laser portion, the airway was widely open although there did exist some mild edema at the level of the vocal folds.  Injection of steroid (decdron 10mg/cc x 1 cc) into the area of the stenosis was performed.      The patient was then turned back to the anesthesia team for emergence after surgeon trach change to 4-0 cuffless hsiley.  The patient was transferred to the PACU in stable condition.      Evidence of Infection: No   Complications:  None; patient tolerated the procedure well.    Disposition: PACU - hemodynamically stable.  Condition: stable   Attending Attestation: I was present for the entirety of the procedure(s).     Prince Gann  Phone Number: [phone]

## 2025-07-23 NOTE — DISCHARGE INSTRUCTIONS
.  Postoperative Care Instructions:  Microdirect Laryngoscopy with Removal of Vocal Cord Polyp, Granuloma, Cyst, or Papilloma    Postoperative Care:  For your surgery, special microscopic instruments were used and an operating telescope was placed in your mouth to look at your vocal cords. No external incisions made.      It is normal for your voice to be hoarse for several days or weeks after surgery while the healing process occurs.     It is very important to use a “conservative” or “confidential voice” after surgery to allow your voice to recover.  This means that you are using your normal voice at a much lower volume than usual, without any straining, yelling, screaming, or singing.  This typically sounds like a soft or breathy whisper.  It is also important to avoid extended periods of voice use.   Do not speak to anyone who is farther than an arm's length away, as this would require you to raise your voice.      It is very important to avoid excessive coughing or throat clearing after surgery, as this will slow down the healing process.  If you have an urge to cough that you cannot control on your own with relaxation techniques, you can purchase an over-the-counter cough suppressant to use, but make sure it does not interact with your other medications.      Finally, make sure to drink plenty of water to stay well hydrated after surgery, as this will help to speed your recovery by keeping your secretions thin and your vocal cords moist.  Use of cough lozenges is also allowed.      Diet: You may resume your normal diet after surgery. You may want to start out with liquids and light meals and advance to your usual diet as tolerated.     Our Nurse will contact you a few days after surgery to schedule your follow up appointment, which is typically 3-6 weeks after surgery.  For any questions or concerns, please call the respective office between the hours of 9am-4pm.   Please call:  Dr. Gann's office @  [phone]  Dr. Keith's office @ [phone]   Dr. Chen's office @ [phone]  If issues arise over the weekend or after hours, please call our hospital  at [phone] and ask for the ENT resident on call.    What to Expect Following Surgery:    The following are some symptoms that may follow your recent surgery:    Pain - Some mild pain is normal after surgery.  As adequate pain control is necessary for healing, please take over-the-counter Tylenol or Motrin per the package directions as needed for pain.  Occasionally, a narcotic pain medication is prescribed for your use after surgery.  If this is the case, please take the medication only as directed.  You may need to take an over-the-counter stool softener as narcotic pain medications can cause constipation. Massage, cold packs, relaxation techniques, listening to music, and positive thinking will also help control your postoperative pain.    Taste disturbance and/or tongue numbness - Due to the surgical instruments used during surgery, you may experience tongue numbness and/or taste disturbance after surgery, but this is usually temporary.  It may take 1-4 weeks to completely resolve.  It is also normal for your tongue to feel swollen or bruised after surgery, and this will also resolve in a few days.    Bleeding - For a few days after surgery, it is normal to cough up some blood in your mucus.  However, if you experience continuous bright red bleeding from your mouth or if you are coughing up blood clots, please call your doctor's office immediately.  If you are on any blood thinning medications, such as Aspirin, Plavix, or Coumadin, you may restart them immediately after surgery unless you were specifically told not to do so by your surgeon.    Muscle aches/soreness - You may experience generalized muscle aches and/or soreness after surgery, and this is a normal effect of anesthesia.  They should completely resolve after a few days.      Swelling/throat tightness - If you were given steroid medication, this can help with swelling and should be taken as directed. The side effects from steroid use is typically minimal when taken for short periods, as used in these cases. If you have questions, please discuss with your pharmacist.

## 2025-07-23 NOTE — ANESTHESIA PREPROCEDURE EVALUATION
Patient: Bandar Robins    Procedure Information       Date/Time: 07/23/25 1240    Procedure: Microlaryngoscopy, Bronchoscopy, CO2 laser (Bilateral)    Location: [facility] OR 05 / Virtual [facility] OR    Surgeons: Prince Gann MD            Relevant Problems   Anesthesia (within normal limits)      Pulmonary   (+) Mild persistent asthma without complication (HHS-HCC)      Neuro   (+) Depression      Musculoskeletal   (+) Scoliosis      HEENT  Tracheal stenosis.       Clinical information reviewed:   Tobacco  Allergies  Meds   Med Hx  Surg Hx   Fam Hx  Soc Hx        NPO Detail:  NPO/Void Status  Date of Last Liquid: 07/22/25  Time of Last Liquid: 0600  Date of Last Solid: 07/22/25  Time of Last Solid: 2100  Last Intake Type: Clear fluids         Physical Exam    Airway  Mallampati: III     Cardiovascular    Dental    Pulmonary    Abdominal            Anesthesia Plan    History of general anesthesia?: yes  History of complications of general anesthesia?: no    ASA 3     general     intravenous induction   Anesthetic plan and risks discussed with patient.    Plan discussed with CAA.

## 2025-07-23 NOTE — ANESTHESIA POSTPROCEDURE EVALUATION
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    Anesthesia Post Evaluation    Patient location during evaluation: PACU  Patient participation: complete - patient participated  Level of consciousness: awake and alert  Pain score: 0  Pain management: adequate  Airway patency: patent  Cardiovascular status: acceptable, blood pressure returned to baseline, hemodynamically stable and stable  Respiratory status: acceptable, spontaneous ventilation and face mask  Hydration status: acceptable  Postoperative Nausea and Vomiting: none        There were no known notable events for this encounter.

## 2025-07-23 NOTE — ANESTHESIA PROCEDURE NOTES
Airway  Date/Time: 7/23/2025 4:56 PM  Reason: elective    Airway not difficult    Staffing  Performed: ALEX   Authorized by: Nikki Pires MD    Performed by: ALEX Bacon  Patient location during procedure: OR    Patient Condition  Indications for airway management: anesthesia and airway protection  Patient position: sniffing  Planned trial extubation  Sedation level: deep     Final Airway Details   Preoxygenated: yes  Final airway type: endotracheal airway  Successful airway: ETT and laser tube (5.0 tenax through trach site)  Cuffed: yes   Successful intubation technique: direct laryngoscopy  Adjuncts used in placement: intubating stylet  Endotracheal tube insertion site: oral  Blade: Ikra  Blade size: #3  ETT size (mm): 5.0  Placement verified by: chest auscultation and capnometry   Ventilation between attempts: none  Number of attempts at approach: 1  Number of other approaches attempted: 0    Additional Comments  5.0 tenax tube placed through trach site once general anesthesia induced.

## 2025-07-23 NOTE — H&P
History Of Present Illness  Bandar Robins is a 20 y.o. male presenting with airway stenosis with tracheostomy present. His stenosis that is causing him to have difficulty with capping. I anticipate need for a partial left arytenoidectomy and removal of scar from the interarytenoid space to improve QOL and breathing quality leading to decannulation. Possible cordotomy.  The risks, indications, and complications were discussed with the patient.      He will follow up for this procedure. He will require 1-2 visits after this with possible decannulation before the year.     Findings on exam: Right vocal cord movement reduced, left immobility, interarytenoid scar band, posterior web. + scar band, + airway narrowing causing capping difficulty, absent left arytenoid tissue with scarring, lateral supraglottic tension, fully closed glottis, and right movement potential on bronchoscopy.      Clinical decision making:  - Risks: injury to lips, teeth, gums, tongue, slight increased swallowing problems.  - Benefits: improved breathing, potential decannulation.  - Patient is a good candidate.    Prior Hx:  Bandar Robins is a 18 y.o. male, who was a former established patient by Dr. Thorne, last seen in December 2021. 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 At last visit, Dr. Thorne discussed plans for an airway evaluation prior to planning trach decannulation, as well as upsizing tracheostomy at the time given the patient has progressed to an adult age. Unfortunately patient was lost to follow-up, so surgery has been postponed. He recently presented to the ED for abdominal pain and was referred to follow-up with ENT for long term trach management. Accompanied by sister.      Mother did most of trach care in home. Unfortunately, mother passed away several months ago, so they have not been able to adequately care for trach. The trach has  not been changed in ~1 year. No breathing issues.      The patient is a 20-year-old male who has a tracheostomy due to BPD.    Tracheostomy Care  - He takes care of his trach on his own and last changed it about two months ago.  - He says his breathing feels fine, his voice is strong, and he has no trouble swallowing.    Diet and Work Status  - He is able to eat a full diet and is not currently working.     Past Medical History  Medical History[1]    Surgical History  Surgical History[2]     Social History  He reports that he has never smoked. He has never used smokeless tobacco. He reports that he does not drink alcohol and does not use drugs.    Family History  Family History[3]     Allergies  Patient has no known allergies.    Review of Systems   Constitutional:  Negative for fever.   Cardiovascular: Negative.  Negative for chest pain.   Genitourinary: Negative.    Skin: Negative.    Psychiatric/Behavioral: Negative.     All other systems reviewed and are negative.       Physical Exam  Constitutional:       General: He is not in acute distress.     Appearance: He is not ill-appearing.   HENT:      Right Ear: External ear normal.      Left Ear: External ear normal.      Nose: Nose normal.      Mouth/Throat:      Mouth: Mucous membranes are moist.     Eyes:      Pupils: Pupils are equal, round, and reactive to light.     Pulmonary:      Effort: Pulmonary effort is normal.     Skin:     General: Skin is warm and dry.     Neurological:      General: No focal deficit present.     Psychiatric:         Mood and Affect: Mood normal.       Assessment & Plan  Tracheal stenosis      Patient and I discussed the risks, benefits and alternatives to surgery and all questions answered.  Cleared to OR.       Prince Gann MD         [1]   Past Medical History:  Diagnosis Date    24 weeks gestation of pregnancy ([facility]) 03/30/2015    24 weeks gestation of pregnancy    Congenital laryngomalacia     Laryngomalacia    Other  conditions influencing health status 03/30/2015    History of gastrostomy tube placement    Other specified postprocedural states 03/30/2015    Status post Nissen fundoplication    Outcome of delivery, unspecified 03/30/2015    Twin birth    Paralysis of vocal cords and larynx, unspecified 04/07/2017    Vocal cord paralysis   [2]   Past Surgical History:  Procedure Laterality Date    GASTROSTOMY TUBE PLACEMENT      OTHER SURGICAL HISTORY  01/22/2014    Trachectomy   [3]   Family History  Problem Relation Name Age of Onset    Hypertension Mother      Sleep apnea Mother      Other (hysterectomy) Mother      Asthma Sister      Allergies Sister          multiple    Cystic fibrosis Other MUltiple Family members     Narcolepsy Other MUltiple Family members     Other (chronic lung disease) Other MUltiple Family members

## 2025-07-24 NOTE — SIGNIFICANT EVENT
I called the emergency contact/person listed on Handoff Guide that Mr. Robins provided. I called Anette, and she was unaware that the patient was at the hospital and had a procedure completed. I inquired of Bandar why he didn't tell her but provided her information for texting,  in pre-op and emergency. He stated he didn't know why he hadn't informed her but used her as his point of contact. Discharge instructions were reviewed with her and patient and both acknowledged understanding.

## 2025-07-26 ENCOUNTER — DOCUMENTATION (OUTPATIENT)
Dept: OTOLARYNGOLOGY | Facility: HOSPITAL | Age: 20
End: 2025-07-26
Payer: COMMERCIAL

## 2025-07-27 ENCOUNTER — TELEPHONE (OUTPATIENT)
Dept: OTOLARYNGOLOGY | Facility: HOSPITAL | Age: 20
End: 2025-07-27
Payer: COMMERCIAL

## 2025-07-27 NOTE — PROGRESS NOTES
Called by the patient via the call holding line.  The patient informed me that he has essentially run out of his 10 [place] suction catheters for his trach.  The patient used to get shipments to his house but these have stopped and he is unsure why.  He has enough suction to get him through the night but is worried that he will not be able to get through the weekend to be able to call Dr. Gann's office to get replacements.  I spoke with the patient and informed him that if he were to develop any concerns with mucous plugging or respiratory compromise to immediately present to the ED.  He ensured me that he is currently breathing fine and is able to suction out his secretions with the suction catheters that he has.    I also informed the patient that we would be willing to give him suction catheters from the hospital in order to get him through the next week until he is able to get shipments coordinated through Dr. Gann's office.  The patient indicated that he did not have anyone available to  the suction catheters until tomorrow around noon.  I informed the patient that we would try to get him enough suction catheters and again reiterated that if there are any concerns to report to the ED.    Katharina Peña MD - PGY3  Otolaryngology - Head & Neck Surgery  [facility]    ENT Consult pager: [phone]  ENT Peds pager: [phone]  ENT Head & Neck Surgery Phone: [phone]  ENT subspecialty team: Sly individual resident who wrote today's note  ENT Outpatient scheduling number: [phone]  Please Page if Urgent

## 2025-07-28 NOTE — TELEPHONE ENCOUNTER
I called Mr. Robins to follow-up on the conversation he had with Dr. Peña on 7/26. Patient stated that is now on his last suction catheter. He stated that he currently has no concerns for mucus plugging or any issues from an upper respiratory or trach care standpoint. I told him, if he were to get transportation to the hospital, then he can stop by [facility] to  more supplies and that if he feels like he has run out of supplies and is unable to get a ride, then to consider calling 911 to seek transport to the ED for help.     Explained to them that they could call the call holding line again if they wanted to discuss any further.    Juan Luis Link MD - PGY2  Otolaryngology - Head and Neck Surgery  Head & Neck team phone: [phone]  ENT consult pager: [phone]   Pediatric ENT pager: [phone]  ENT Outpatient scheduling number: [phone]

## 2025-08-25 ENCOUNTER — OFFICE VISIT (OUTPATIENT)
Dept: OTOLARYNGOLOGY | Facility: HOSPITAL | Age: 20
End: 2025-08-25
Payer: COMMERCIAL

## 2025-08-25 ASSESSMENT — PAIN SCALES - GENERAL: PAINLEVEL_OUTOF10: 0-NO PAIN

## 2025-08-25 ASSESSMENT — PATIENT HEALTH QUESTIONNAIRE - PHQ9
1. LITTLE INTEREST OR PLEASURE IN DOING THINGS: NOT AT ALL
2. FEELING DOWN, DEPRESSED OR HOPELESS: NOT AT ALL
SUM OF ALL RESPONSES TO PHQ9 QUESTIONS 1 & 2: 0

## (undated) DEVICE — CUP, SOLUTION

## (undated) DEVICE — Device

## (undated) DEVICE — DRAPE, SHEET, FAN FOLDED, HALF, 44 X 58 IN, DISPOSABLE, LF, STERILE

## (undated) DEVICE — BOWL, BASIN, 32 OZ, STERILE

## (undated) DEVICE — ANTIFOG, SOLUTION, FOG-OUT

## (undated) DEVICE — BASIN SET, BRONCHOSCOPY

## (undated) DEVICE — PITCHER, GRADUATE, 32 OZ (1200CC), STERILE

## (undated) DEVICE — CATHETER, IV, INSYTE, AUTOGUARD, SHIELDED, 24 G X 0.75 IN, VIALON

## (undated) DEVICE — TUBING, SUCTION, CONNECTING, STERILE 0.25 X 120 IN., LF

## (undated) DEVICE — COVER, CART, 45 X 27 X 48 IN, CLEAR

## (undated) DEVICE — COUNTER, NEEDLE, FOAM BLOCK, W/MAGNET, W/BLADE GUARD, 10 COUNT, RED, LF

## (undated) DEVICE — PAD, EYE, OVAL, 1 5/8 X 2 5/8 IN, STERILE

## (undated) DEVICE — ACUSPOT 712 MICROMANIPULATOR

## (undated) DEVICE — DRESSING, GAUZE, 16 PLY, 4 X 4 IN, STERILE

## (undated) DEVICE — MANIFOLD, 4 PORT NEPTUNE STANDARD

## (undated) DEVICE — MARKER, SKIN, RULER AND LABEL PACK, CUSTOM

## (undated) DEVICE — MARKER, SKIN, XFINE TIP, W/RULER AND LABELS

## (undated) DEVICE — HOLSTER, JET SAFETY

## (undated) DEVICE — COVER, TABLE, 44 X 75 IN, DISPOSABLE, LF, STERILE

## (undated) DEVICE — SHIELD, EYE, FOX, CONVEX, ALUMINUM, NS

## (undated) DEVICE — SYRINGE, HYPODERMIC, TB, W/O NEEDLE, 1 CC, SLIP TIP

## (undated) DEVICE — SYRINGE, 60 CC, IRRIGATION, BULB, CONTRO-BULB, PAPER POUCH

## (undated) DEVICE — NEEDLE, INJECTION, OROTRACHEAL

## (undated) DEVICE — REST, HEAD, BAGEL, 9 IN

## (undated) DEVICE — COVER, MAYO STAND, W/PAD, 23 IN, DISPOSABLE, PLASTIC, LF, STERILE

## (undated) DEVICE — BALLOON, ESOPHAGEAL, ELATION, 8CM, 18-19-20, FIXED WIRE

## (undated) DEVICE — SOLUTION, IRRIGATION, SODIUM CHLORIDE 0.9%, 1000 ML, POUR BOTTLE

## (undated) DEVICE — DENTITION PROTECTOR, QUICKGUARD, NON-PERF